# Patient Record
Sex: MALE | Race: WHITE | NOT HISPANIC OR LATINO | Employment: UNEMPLOYED | URBAN - NONMETROPOLITAN AREA
[De-identification: names, ages, dates, MRNs, and addresses within clinical notes are randomized per-mention and may not be internally consistent; named-entity substitution may affect disease eponyms.]

---

## 2021-04-16 ENCOUNTER — IMMUNIZATION (OUTPATIENT)
Dept: VACCINE CLINIC | Facility: HOSPITAL | Age: 31
End: 2021-04-16

## 2021-04-16 PROCEDURE — 0001A: CPT | Performed by: INTERNAL MEDICINE

## 2021-04-16 PROCEDURE — 91300 HC SARSCOV02 VAC 30MCG/0.3ML IM: CPT | Performed by: INTERNAL MEDICINE

## 2021-05-07 ENCOUNTER — IMMUNIZATION (OUTPATIENT)
Dept: VACCINE CLINIC | Facility: HOSPITAL | Age: 31
End: 2021-05-07

## 2021-05-07 PROCEDURE — 91300 HC SARSCOV02 VAC 30MCG/0.3ML IM: CPT | Performed by: INTERNAL MEDICINE

## 2021-05-07 PROCEDURE — 0002A: CPT | Performed by: INTERNAL MEDICINE

## 2023-01-20 ENCOUNTER — HOSPITAL ENCOUNTER (OUTPATIENT)
Age: 33
Discharge: HOME | End: 2023-01-20

## 2023-01-20 ENCOUNTER — OFFICE VISIT (OUTPATIENT)
Dept: INTERNAL MEDICINE CLINIC | Age: 33
End: 2023-01-20

## 2023-01-20 VITALS
DIASTOLIC BLOOD PRESSURE: 80 MMHG | HEART RATE: 108 BPM | OXYGEN SATURATION: 97 % | WEIGHT: 201.8 LBS | SYSTOLIC BLOOD PRESSURE: 128 MMHG | HEIGHT: 67 IN | TEMPERATURE: 97.3 F | BODY MASS INDEX: 31.67 KG/M2

## 2023-01-20 DIAGNOSIS — Z00.00 PREVENTATIVE HEALTH CARE: ICD-10-CM

## 2023-01-20 DIAGNOSIS — E66.9 CLASS 1 OBESITY WITHOUT SERIOUS COMORBIDITY WITH BODY MASS INDEX (BMI) OF 31.0 TO 31.9 IN ADULT, UNSPECIFIED OBESITY TYPE: ICD-10-CM

## 2023-01-20 DIAGNOSIS — Z76.89 ENCOUNTER TO ESTABLISH CARE: Primary | ICD-10-CM

## 2023-01-20 DIAGNOSIS — F41.1 GENERALIZED ANXIETY DISORDER: ICD-10-CM

## 2023-01-20 LAB
A/G RATIO: 2 (ref 1.1–2.2)
ALBUMIN SERPL-MCNC: 5 G/DL (ref 3.4–5)
ALP BLD-CCNC: 66 U/L (ref 40–129)
ALT SERPL-CCNC: 23 U/L (ref 10–40)
AMPHETAMINE SCREEN, URINE: ABNORMAL
ANION GAP SERPL CALCULATED.3IONS-SCNC: 12 MMOL/L (ref 3–16)
AST SERPL-CCNC: 19 U/L (ref 15–37)
BARBITURATE SCREEN URINE: ABNORMAL
BASOPHILS ABSOLUTE: 0.1 K/UL (ref 0–0.2)
BASOPHILS RELATIVE PERCENT: 0.6 %
BENZODIAZEPINE SCREEN, URINE: ABNORMAL
BILIRUB SERPL-MCNC: 0.8 MG/DL (ref 0–1)
BUN BLDV-MCNC: 12 MG/DL (ref 7–20)
CALCIUM SERPL-MCNC: 10.6 MG/DL (ref 8.3–10.6)
CANNABINOID SCREEN URINE: POSITIVE
CHLORIDE BLD-SCNC: 100 MMOL/L (ref 99–110)
CHOLESTEROL, TOTAL: 212 MG/DL (ref 0–199)
CO2: 25 MMOL/L (ref 21–32)
COCAINE METABOLITE SCREEN URINE: ABNORMAL
CREAT SERPL-MCNC: 1.2 MG/DL (ref 0.9–1.3)
EOSINOPHILS ABSOLUTE: 0.3 K/UL (ref 0–0.6)
EOSINOPHILS RELATIVE PERCENT: 1.9 %
FENTANYL SCREEN, URINE: ABNORMAL
GFR SERPL CREATININE-BSD FRML MDRD: >60 ML/MIN/{1.73_M2}
GLUCOSE BLD-MCNC: 92 MG/DL (ref 70–99)
HCT VFR BLD CALC: 49.9 % (ref 40.5–52.5)
HDLC SERPL-MCNC: 43 MG/DL (ref 40–60)
HEMOGLOBIN: 16.4 G/DL (ref 13.5–17.5)
HEPATITIS C ANTIBODY INTERPRETATION: NORMAL
LDL CHOLESTEROL CALCULATED: 136 MG/DL
LYMPHOCYTES ABSOLUTE: 3.1 K/UL (ref 1–5.1)
LYMPHOCYTES RELATIVE PERCENT: 22 %
Lab: ABNORMAL
MCH RBC QN AUTO: 31.2 PG (ref 26–34)
MCHC RBC AUTO-ENTMCNC: 32.9 G/DL (ref 31–36)
MCV RBC AUTO: 94.6 FL (ref 80–100)
METHADONE SCREEN, URINE: ABNORMAL
MONOCYTES ABSOLUTE: 1 K/UL (ref 0–1.3)
MONOCYTES RELATIVE PERCENT: 7.5 %
NEUTROPHILS ABSOLUTE: 9.5 K/UL (ref 1.7–7.7)
NEUTROPHILS RELATIVE PERCENT: 68 %
OPIATE SCREEN URINE: ABNORMAL
OXYCODONE URINE: ABNORMAL
PDW BLD-RTO: 12.7 % (ref 12.4–15.4)
PH UA: 5
PHENCYCLIDINE SCREEN URINE: ABNORMAL
PLATELET # BLD: 306 K/UL (ref 135–450)
PMV BLD AUTO: 9.8 FL (ref 5–10.5)
POTASSIUM SERPL-SCNC: 4.5 MMOL/L (ref 3.5–5.1)
RBC # BLD: 5.27 M/UL (ref 4.2–5.9)
SODIUM BLD-SCNC: 137 MMOL/L (ref 136–145)
TOTAL PROTEIN: 7.5 G/DL (ref 6.4–8.2)
TRIGL SERPL-MCNC: 164 MG/DL (ref 0–150)
TSH REFLEX FT4: 2.64 UIU/ML (ref 0.27–4.2)
VLDLC SERPL CALC-MCNC: 33 MG/DL
WBC # BLD: 13.9 K/UL (ref 4–11)

## 2023-01-20 PROCEDURE — 83036 HEMOGLOBIN GLYCOSYLATED A1C: CPT

## 2023-01-20 PROCEDURE — 86701 HIV-1ANTIBODY: CPT

## 2023-01-20 PROCEDURE — 80307 DRUG TEST PRSMV CHEM ANLYZR: CPT

## 2023-01-20 PROCEDURE — 87390 HIV-1 AG IA: CPT

## 2023-01-20 PROCEDURE — 86702 HIV-2 ANTIBODY: CPT

## 2023-01-20 PROCEDURE — 85025 COMPLETE CBC W/AUTO DIFF WBC: CPT

## 2023-01-20 PROCEDURE — 86803 HEPATITIS C AB TEST: CPT

## 2023-01-20 PROCEDURE — 80053 COMPREHEN METABOLIC PANEL: CPT

## 2023-01-20 PROCEDURE — 84443 ASSAY THYROID STIM HORMONE: CPT

## 2023-01-20 PROCEDURE — 80061 LIPID PANEL: CPT

## 2023-01-20 PROCEDURE — 36415 COLL VENOUS BLD VENIPUNCTURE: CPT

## 2023-01-20 RX ORDER — HYDROXYZINE 50 MG/1
50 TABLET, FILM COATED ORAL 3 TIMES DAILY PRN
COMMUNITY

## 2023-01-20 RX ORDER — SERTRALINE HYDROCHLORIDE 25 MG/1
25 TABLET, FILM COATED ORAL DAILY
Qty: 30 TABLET | Refills: 3 | Status: SHIPPED | OUTPATIENT
Start: 2023-01-20

## 2023-01-20 RX ORDER — ALPRAZOLAM 0.5 MG/1
0.25 TABLET ORAL 3 TIMES DAILY PRN
Qty: 45 TABLET | Refills: 0 | Status: SHIPPED | OUTPATIENT
Start: 2023-01-20 | End: 2023-02-19

## 2023-01-20 SDOH — ECONOMIC STABILITY: FOOD INSECURITY: WITHIN THE PAST 12 MONTHS, YOU WORRIED THAT YOUR FOOD WOULD RUN OUT BEFORE YOU GOT MONEY TO BUY MORE.: NEVER TRUE

## 2023-01-20 SDOH — ECONOMIC STABILITY: FOOD INSECURITY: WITHIN THE PAST 12 MONTHS, THE FOOD YOU BOUGHT JUST DIDN'T LAST AND YOU DIDN'T HAVE MONEY TO GET MORE.: NEVER TRUE

## 2023-01-20 ASSESSMENT — PATIENT HEALTH QUESTIONNAIRE - PHQ9
2. FEELING DOWN, DEPRESSED OR HOPELESS: 0
SUM OF ALL RESPONSES TO PHQ QUESTIONS 1-9: 0
SUM OF ALL RESPONSES TO PHQ9 QUESTIONS 1 & 2: 0
1. LITTLE INTEREST OR PLEASURE IN DOING THINGS: 0
SUM OF ALL RESPONSES TO PHQ QUESTIONS 1-9: 0

## 2023-01-20 ASSESSMENT — SOCIAL DETERMINANTS OF HEALTH (SDOH): HOW HARD IS IT FOR YOU TO PAY FOR THE VERY BASICS LIKE FOOD, HOUSING, MEDICAL CARE, AND HEATING?: NOT HARD AT ALL

## 2023-01-20 NOTE — PROGRESS NOTES
[facility]  Establish care visit   2023    Heather Aguilar (:  1990) is a 34 y.o. male, here to establish care. Chief Complaint   Patient presents with    Established New Doctor    Anxiety        ASSESSMENT/ PLAN  1. Encounter to establish care    2. Preventative health care  - TSH with Reflex to FT4; Future  - HIV Screen; Future  - Hepatitis C Antibody; Future  - Lipid Panel; Future  - Comprehensive Metabolic Panel; Future  - CBC with Auto Differential; Future  - Hemoglobin A1C; Future    3. Generalized anxiety disorder  -Symptoms consistent with anxiety disorder. Start on Zoloft 25 mg daily, start Xanax for 30 days until Zoloft kicks in. Denies any SI or HI. Denies feeling depressed. Follow-up with Cheikh Garcia in 4 to 6 weeks. Medication contract signed. Obtain urine drug screen  - ALPRAZolam (XANAX) 0.5 MG tablet; Take 0.5 tablets by mouth 3 times daily as needed for Sleep for up to 30 days. Max Daily Amount: 0.75 mg  Dispense: 45 tablet; Refill: 0  - sertraline (ZOLOFT) 25 MG tablet; Take 1 tablet by mouth daily  Dispense: 30 tablet; Refill: 3  - DRUG SCREEN MULTI URINE; Future    4. Class 1 obesity without serious comorbidity with body mass index (BMI) of 31.0 to 31.9 in adult, unspecified obesity type  -BMI of 31.61. Counseled on diet weight loss and exercise. Return in about 6 months (around 2023) for Anxiety . HPI  Patient is a 28-year-old male who presents to establish care with me. He does not have any significant past medical history. He noted that a couple of days ago he got into a car and felt scared to drive, felt lightheaded. He has a family history of diabetes and thought that his blood glucose was low. He went back home and measured his blood pressure which was high, he kept measuring blood pressures which kept getting higher and higher. He did not understand what was going on with him.   He went to the urgent care where he was discharged with Atarax with some relief. Denies any chest pain, shortness of breath today. 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 Does not feel depressed and does not have any SI or HI. Denies any chest pain, shortness of breath today. His blood pressure measured twice in the clinic was at goal today. ROS:      CONSTITUTIONAL:  No fevers, chills, sweats or weight changes  EYES:  No redness or visual symptoms. EARS, NOSE AND THROAT:  No difficulties with hearing. No symptoms of rhinitis or sore throat. CARDIOVASCULAR:  No chest pains, palpitations, orthopnea or paroxysmal noctunal dyspnea. RESPIRATORY:  No dyspnea on exertion, wheezing or cough. GI:  No nausea, vomiting, diarrhea, constipation, abdominal pain, hematochezia or melena. :  No dysuria, urinary hesitancy or dribbling. No nocturia or urinary frequency. No abnormal urethral  discharge. MUSCULOSKELETAL:  No muscle, joint or back aches  NEUROLOGIC:  No chronic headaches, no seizures. No numbness, tingling or weakness. PSYCHIATRIC: Endorses problems with anxiety  ENDOCRINE:  No excessive urination or excessive thirst.  DERMATOLOGIC:  No rashes or skin changes. HISTORIES  Current Outpatient Medications on File Prior to Visit   Medication Sig Dispense Refill    hydrOXYzine HCl (ATARAX) 50 MG tablet Take 50 mg by mouth 3 times daily as needed for Itching       No current facility-administered medications on file prior to visit. No Known Allergies    Past Medical History:   Diagnosis Date    Anxiety        There is no problem list on file for this patient.        Past Surgical History:   Procedure Laterality Date    TONSILLECTOMY  07/07/1995       Social History     Socioeconomic History    Marital status: Unknown     Spouse name: Not on file    Number of children: Not on file    Years of education: Not on file    Highest education level: Not on file   Occupational History    Not on file   Tobacco Use    Smoking status: Never Smokeless tobacco: Never   Vaping Use    Vaping Use: Never used   Substance and Sexual Activity    Alcohol use: Never    Drug use: Not Currently     Types: Marijuana Dauna Other)    Sexual activity: Not Currently     Partners: Female   Other Topics Concern    Not on file   Social History Narrative    Not on file     Social Determinants of Health     Financial Resource Strain: Low Risk     Difficulty of Paying Living Expenses: Not hard at all   Food Insecurity: No Food Insecurity    Worried About Running Out of Food in the Last Year: Never true    Ran Out of Food in the Last Year: Never true   Transportation Needs: Not on file   Physical Activity: Not on file   Stress: Not on file   Social Connections: Not on file   Intimate Partner Violence: Not on file   Housing Stability: Not on file        Family History   Problem Relation Age of Onset    Asthma Father     Anxiety Disorder Father     Rashes/Skin Problems Paternal Grandfather        Physical Exam:    GENERAL APPEARANCE:  The patient is pleasant and well-appearing, in no acute distress A&Ox3, normal habitus. No lymphadenopathy. LUNGS:  Equal air entry and clear of auscultation bilaterally. There are no crackles, wheezes or rhonchi noted. HEART:  Regular rate and rhythm, S1/S2. No murmurs are noted. There are no lifts, heaves, or thrills noted on palpation. ABDOMEN:  Soft, non tender, non distended and no organomegaly. There are good bowel sounds. There is no rebounding or guarding. There is no evidence of hernia. SKIN:  There are no rashes, lesions, or ulcers noted. Warm and dry with good turgor. MUSCULOSKELETAL:  Gait is coordinated and smooth. There is no clubbing, cyanosis or edema. B/I pedal pulses are palpable. NEUROLOGIC:  Cranial nerves II through XII are grossly intact. Sensation to light touch and pain is intact and bilaterally.     Vitals:    01/20/23 1050   BP: 128/80   Site: Right Upper Arm   Position: Sitting   Cuff Size: Medium Adult Pulse: (!) 108   Temp: 97.3 °F (36.3 °C)   TempSrc: Temporal   SpO2: 97%   Weight: 201 lb 12.8 oz (91.5 kg)   Height: 5' 7\" (1.702 m)     Estimated body mass index is 31.61 kg/m² as calculated from the following:    Height as of this encounter: 5' 7\" (1.702 m). Weight as of this encounter: 201 lb 12.8 oz (91.5 kg). Immunization History   Administered Date(s) Administered    COVID-19, PFIZER PURPLE top, DILUTE for use, (age 15 y+), 30mcg/0.3mL 04/16/2021, 05/07/2021       Health Maintenance   Topic Date Due    Varicella vaccine (1 of 2 - 2-dose childhood series) Never done    HIV screen  Never done    Hepatitis C screen  Never done    DTaP/Tdap/Td vaccine (1 - Tdap) Never done    COVID-19 Vaccine (3 - Booster for Pfizer series) 07/02/2021    Flu vaccine (1) Never done    Depression Screen  01/21/2023    Hepatitis A vaccine  Aged Out    Hib vaccine  Aged Out    Meningococcal (ACWY) vaccine  Aged Out    Pneumococcal 0-64 years Vaccine  Aged Out       PSH, PMH, SH and FH reviewed and noted. Recent and past labs, tests and consults also reviewed. Recent or new meds also reviewed. Paula Greenwood MD    This dictation was generated by voice recognition computer software. Although all attempts are made to edit the dictation for accuracy, there may be errors in the transcription that are not intended.

## 2023-01-21 LAB
ESTIMATED AVERAGE GLUCOSE: 93.9 MG/DL
HBA1C MFR BLD: 4.9 %
HIV AG/AB: NORMAL
HIV ANTIGEN: NORMAL
HIV-1 ANTIBODY: NORMAL
HIV-2 AB: NORMAL

## 2023-01-27 ENCOUNTER — TELEPHONE (OUTPATIENT)
Dept: CARDIOLOGY CLINIC | Age: 33
End: 2023-01-27

## 2023-01-27 NOTE — TELEPHONE ENCOUNTER
SCREENING QUESTIONS:       Do you have a history of cardiovascular disease, this includes any of the following- heart stent and/or open-heart surgery, stroke or abdominal aortic aneurysm? No (If the answer is yes please do not schedule)     Are you currently following up with a cardiologist? No     If no, would you like our office to contact you? No        Do you have a family history of abdominal aortic aneurysm, heart attack or stroke? Yes Grandfather had a heart attack     Do you have high cholesterol? Yes    Do you have high blood pressure? Yes    Do you have diabetes? No    Do you currently smoke or are you a former smoker?  Yes Former           WRAP UP:    [] Scheduled on 02/16/23 at the [facility]    [x] Instructions given to patient- Nothing to eat or drink 8 hrs prior to appointment and wear loose, comfortable clothing    [] Not scheduled due to previous history themselves    [] Sent to  pool, has a cardiac history and is not following a cardiologist, would like a follow call

## 2023-01-30 ENCOUNTER — TELEPHONE (OUTPATIENT)
Dept: INTERNAL MEDICINE CLINIC | Age: 33
End: 2023-01-30

## 2023-01-30 NOTE — TELEPHONE ENCOUNTER
Patient updating Dr. Erika Perez that his BP is still consistently high since taking the prescribed medication at last visit. He thinks the anxiety is ok but he's not sure if the anxiety meds are contributing to the high BP. Or if he may need to start a BP medication?

## 2023-01-31 ENCOUNTER — OFFICE VISIT (OUTPATIENT)
Dept: INTERNAL MEDICINE CLINIC | Age: 33
End: 2023-01-31

## 2023-01-31 VITALS
SYSTOLIC BLOOD PRESSURE: 128 MMHG | HEART RATE: 64 BPM | RESPIRATION RATE: 14 BRPM | BODY MASS INDEX: 31.17 KG/M2 | DIASTOLIC BLOOD PRESSURE: 78 MMHG | WEIGHT: 199 LBS | TEMPERATURE: 98.4 F | OXYGEN SATURATION: 99 %

## 2023-01-31 DIAGNOSIS — F41.1 GENERALIZED ANXIETY DISORDER: Primary | ICD-10-CM

## 2023-01-31 DIAGNOSIS — E78.2 MIXED HYPERLIPIDEMIA: ICD-10-CM

## 2023-01-31 DIAGNOSIS — Z01.30 BLOOD PRESSURE CHECK: ICD-10-CM

## 2023-01-31 PROCEDURE — 99213 OFFICE O/P EST LOW 20 MIN: CPT | Performed by: STUDENT IN AN ORGANIZED HEALTH CARE EDUCATION/TRAINING PROGRAM

## 2023-01-31 NOTE — PROGRESS NOTES
[facility]   2023    Angy Ritchie (:  1990) is a 34 y.o. male, here for evaluation of the following medical concerns:    Chief Complaint   Patient presents with    Hypertension     BP Check         ASSESSMENT/ PLAN  1. Generalized anxiety disorder  -Continue follow-up with psychiatry. Continue on Zoloft 50 mg daily and hydroxyzine. Notes marked improvement of symptoms    2. Blood pressure check  -Blood pressure today at goal.  Did check patient's wrist blood pressure cuff. Appears that his cuff is giving a higher reading. Advised patient to get a arm cuff if he can. Counseled on DASH diet, exercise, weight loss. 3.  Mixed hyperlipidemia  -Recent LDL of 136. Counseled on diet weight loss and exercise as above. HPI  Patient is a 25-year-old male presenting for follow-up for anxiety disorder and check blood pressure. He noted that he has been measuring blood pressures at home which have been about the 988 systolic. He however denies any chest pain, shortness of breath, lower extremity edema. I have asked him to get his blood pressure monitor to the visit which he has brought with him. He has also recently seen a psychiatrist and had increasing Zoloft to 50 mg daily and is currently on hydroxyzine. Not taking Xanax anymore. Notes marked improvement in symptoms    ROS    CONSTITUTIONAL:  No fevers, chills, sweats or weight changes  EYES:  No redness or visual symptoms. EARS, NOSE AND THROAT:  No difficulties with hearing. No symptoms of rhinitis or sore throat. CARDIOVASCULAR:  No chest pains, palpitations, orthopnea or paroxysmal noctunal dyspnea. RESPIRATORY:  No dyspnea o exertion, wheezing or cough. GI:  No nausea, vomiting, diarrhea, constipation, abdominal pain, hematochezia or melena. :  No dysuria, urinary hesitancy or dribbling. No nocturia or urinary frequency. No abnormal urethral  discharge.   MUSCULOSKELETAL:  No muscle, joint or back aches  NEUROLOGIC:  No chronic headaches, no seizures. No numbness, tingling or weakness. PSYCHIATRIC:  No anxiety, mood or sleep disturbance. ENDOCRINE:  No excessive urination or excessive thirst.  DERMATOLOGIC:  No rashes or skin changes. HISTORIES  Current Outpatient Medications on File Prior to Visit   Medication Sig Dispense Refill    sertraline (ZOLOFT) 50 MG tablet       hydrOXYzine HCl (ATARAX) 50 MG tablet Take 50 mg by mouth 3 times daily as needed for Itching       No current facility-administered medications on file prior to visit. Past Medical History:   Diagnosis Date    Anxiety      There is no problem list on file for this patient. PHYSICAL EXAM    Vitals:    01/31/23 1155   BP: 128/78   Site: Right Upper Arm   Position: Sitting   Cuff Size: Medium Adult   Pulse: 64   Resp: 14   Temp: 98.4 °F (36.9 °C)   TempSrc: Temporal   SpO2: 99%   Weight: 199 lb (90.3 kg)     Estimated body mass index is 31.17 kg/m² as calculated from the following:    Height as of 1/20/23: 5' 7\" (1.702 m). Weight as of this encounter: 199 lb (90.3 kg). GENERAL APPEARANCE:  The patient is pleasant and well-appearing, in no acure distress A&Ox3, normal habitus  HEENT:  Normocephalic and atraumatic. No lymphadenopathy. LUNGS:  Equal air entry and clear of auscultation bilaterally. There are not crackles, wheezes or rhonchi noted. HEART:  Regular rate and rhythm, S1/S2. No murmurs are noted. There are no lifts, heaves, or thrills noted on palpation. SKIN:  There are no rashes, lesions, or ulcers noted. Warm and dry with good turgor. MUSCULOSKELETAL:  Russella Claw is coordinated and smooth. There is no clubbing, cyanosis or edema. B/I pedal pulses are palpable. NEUROLOGIC:  Cranial nerves II through XII are grossly intact. Sensation to light touch and pain is intact and bilaterally. Mandy Craft MD    This dictation was generated by voice recognition computer software.   Although all attempts are made to edit the dictation for accuracy, there may be errors in the transcription that are not intended.

## 2023-02-16 ENCOUNTER — PROCEDURE VISIT (OUTPATIENT)
Dept: CARDIOLOGY CLINIC | Age: 33
End: 2023-02-16

## 2023-02-16 DIAGNOSIS — Z13.6 ENCOUNTER FOR SCREENING FOR CARDIOVASCULAR DISORDERS: Primary | ICD-10-CM

## 2024-11-13 ENCOUNTER — TELEPHONE (OUTPATIENT)
Dept: INTERNAL MEDICINE CLINIC | Age: 34
End: 2024-11-13

## 2024-11-13 NOTE — TELEPHONE ENCOUNTER
Patient called and wanted to be referred to a psychiatrist. I told him he would need to see Siomara first.  Need a doctor to refer him to see Siomara.      Patient also states he is completely out of his sertraline and would like a refill to be sent to [facility] in [place], [place], pharmacy phone #[phone]  Patient states it is dangerous for him to be off the sertraline.

## 2024-11-13 NOTE — TELEPHONE ENCOUNTER
Please call pharmacy to see when last filled and by whom.     I would argue that he should have an appointment scheduled to be seen before a refill can be sent

## 2024-11-13 NOTE — TELEPHONE ENCOUNTER
Patient has not been seen since 01/31/23 in our office, does not look like Dr. Coy has ever filled this medication

## 2024-11-14 NOTE — TELEPHONE ENCOUNTER
Additional Comments     Patient is coming in on Tuesday, 11- at 11:40 a.m. to see Dr. Coy for a Medication Check and Refills.     Only pended this medication with 0 Refills so he can come in for his appointment and then his PCP can give him his refills.           Refill request for ZOLOFT 50 MG medication.     Name of Pharmacy- [facility]      Last visit - 1-     Pending visit - 11-    Last refill - 1-      Medication Contract signed -   Last Oarrs ran-

## 2024-11-14 NOTE — TELEPHONE ENCOUNTER
Called [facility] and they stated that it was last filled on 3-7-2023 by Dr. Andrez Kan in [place], [place]

## 2024-11-14 NOTE — TELEPHONE ENCOUNTER
Patient called back and he is on the schedule to come in on Wednesday at 11:40 a.m. to see Dr. Coy for a medication refill.     Wants to know if the medicine can be filled today because he has been out for 2 or 3 days and he can already feel the withdrawals and he hasn't been able to sleep the last few nights.

## 2024-11-19 ENCOUNTER — OFFICE VISIT (OUTPATIENT)
Dept: INTERNAL MEDICINE CLINIC | Age: 34
End: 2024-11-19
Payer: COMMERCIAL

## 2024-11-19 VITALS
TEMPERATURE: 97.9 F | BODY MASS INDEX: 35.63 KG/M2 | SYSTOLIC BLOOD PRESSURE: 116 MMHG | HEIGHT: 67 IN | HEART RATE: 66 BPM | WEIGHT: 227 LBS | OXYGEN SATURATION: 98 % | DIASTOLIC BLOOD PRESSURE: 82 MMHG

## 2024-11-19 DIAGNOSIS — F41.1 GENERALIZED ANXIETY DISORDER: Primary | ICD-10-CM

## 2024-11-19 DIAGNOSIS — E78.2 MIXED HYPERLIPIDEMIA: ICD-10-CM

## 2024-11-19 DIAGNOSIS — E66.812 CLASS 2 SEVERE OBESITY WITH SERIOUS COMORBIDITY AND BODY MASS INDEX (BMI) OF 35.0 TO 35.9 IN ADULT, UNSPECIFIED OBESITY TYPE: ICD-10-CM

## 2024-11-19 DIAGNOSIS — E66.01 CLASS 2 SEVERE OBESITY WITH SERIOUS COMORBIDITY AND BODY MASS INDEX (BMI) OF 35.0 TO 35.9 IN ADULT, UNSPECIFIED OBESITY TYPE: ICD-10-CM

## 2024-11-19 PROCEDURE — 99214 OFFICE O/P EST MOD 30 MIN: CPT | Performed by: STUDENT IN AN ORGANIZED HEALTH CARE EDUCATION/TRAINING PROGRAM

## 2024-11-19 RX ORDER — SERTRALINE HYDROCHLORIDE 100 MG/1
100 TABLET, FILM COATED ORAL DAILY
Qty: 90 TABLET | Refills: 1 | Status: SHIPPED | OUTPATIENT
Start: 2024-11-19

## 2024-11-19 SDOH — ECONOMIC STABILITY: FOOD INSECURITY: WITHIN THE PAST 12 MONTHS, THE FOOD YOU BOUGHT JUST DIDN'T LAST AND YOU DIDN'T HAVE MONEY TO GET MORE.: SOMETIMES TRUE

## 2024-11-19 SDOH — ECONOMIC STABILITY: INCOME INSECURITY: HOW HARD IS IT FOR YOU TO PAY FOR THE VERY BASICS LIKE FOOD, HOUSING, MEDICAL CARE, AND HEATING?: VERY HARD

## 2024-11-19 SDOH — ECONOMIC STABILITY: FOOD INSECURITY: WITHIN THE PAST 12 MONTHS, YOU WORRIED THAT YOUR FOOD WOULD RUN OUT BEFORE YOU GOT MONEY TO BUY MORE.: SOMETIMES TRUE

## 2024-11-19 SDOH — ECONOMIC STABILITY: TRANSPORTATION INSECURITY
IN THE PAST 12 MONTHS, HAS LACK OF TRANSPORTATION KEPT YOU FROM MEETINGS, WORK, OR FROM GETTING THINGS NEEDED FOR DAILY LIVING?: YES

## 2024-11-19 ASSESSMENT — PATIENT HEALTH QUESTIONNAIRE - PHQ9
SUM OF ALL RESPONSES TO PHQ QUESTIONS 1-9: 0
SUM OF ALL RESPONSES TO PHQ QUESTIONS 1-9: 0
SUM OF ALL RESPONSES TO PHQ9 QUESTIONS 1 & 2: 0
2. FEELING DOWN, DEPRESSED OR HOPELESS: NOT AT ALL
1. LITTLE INTEREST OR PLEASURE IN DOING THINGS: NOT AT ALL
SUM OF ALL RESPONSES TO PHQ QUESTIONS 1-9: 0
SUM OF ALL RESPONSES TO PHQ QUESTIONS 1-9: 0
1. LITTLE INTEREST OR PLEASURE IN DOING THINGS: NOT AT ALL
2. FEELING DOWN, DEPRESSED OR HOPELESS: NOT AT ALL
SUM OF ALL RESPONSES TO PHQ9 QUESTIONS 1 & 2: 0

## 2024-11-19 NOTE — PROGRESS NOTES
[facility]   2024    Ben Willson (:  1990) is a 34 y.o. male, here for evaluation of the following medical concerns:    Chief Complaint   Patient presents with    Medication Refill     Medication refill and med check         ASSESSMENT/ PLAN  1. Generalized anxiety disorder  Mood is improved.  Continue on sertraline.  Wants to establish care with behavioral health.  Recommended to establish care with Siomara.  Refills for sertraline provided  - sertraline (ZOLOFT) 100 MG tablet; Take 1 tablet by mouth daily  Dispense: 90 tablet; Refill: 1    2. Mixed hyperlipidemia  3.  Class II severe obesity with serious comorbidity and body mass index of 35-35.9 in adult, unspecified obesity type  Advised dietary modification, exercise, weight loss  - Comprehensive Metabolic Panel; Future  - LIPID PANEL; Future  - CBC with Auto Differential; Future       Return in about 1 year (around 2025) for Physical .      HPI  Patient is a 34-year-old male presenting for follow-up for anxiety.  He was placed on sertraline and is doing well.  He is currently up to 100 mg daily with good response.  He wants to set up care with behavioral health.  He denies any chest pain, shortness of breath today.  Is due for labs.  Orders placed.  Has gained some weight.  Discussed dietary modification.    ROS    CONSTITUTIONAL:  No fevers, chills, sweats or weight changes  EYES:  No redness or visual symptoms.  EARS, NOSE AND THROAT:  No difficulties with hearing.  No symptoms of rhinitis or sore throat.  CARDIOVASCULAR:  No chest pains, palpitations, orthopnea or paroxysmal noctunal dyspnea.  RESPIRATORY:  No dyspnea o exertion, wheezing or cough.  GI:  No nausea, vomiting, diarrhea, constipation, abdominal pain, hematochezia or melena.    :  No dysuria, urinary hesitancy or dribbling.  No nocturia or urinary frequency.  No abnormal urethral  discharge.  MUSCULOSKELETAL:  No muscle, joint or back aches  NEUROLOGIC:  No chronic

## 2025-01-17 ENCOUNTER — OFFICE VISIT (OUTPATIENT)
Dept: PSYCHOLOGY | Age: 35
End: 2025-01-17
Payer: COMMERCIAL

## 2025-01-17 DIAGNOSIS — R41.840 ATTENTION AND CONCENTRATION DEFICIT: ICD-10-CM

## 2025-01-17 DIAGNOSIS — F41.1 GAD (GENERALIZED ANXIETY DISORDER): Primary | ICD-10-CM

## 2025-01-17 PROCEDURE — 90791 PSYCH DIAGNOSTIC EVALUATION: CPT | Performed by: SOCIAL WORKER

## 2025-01-17 NOTE — PROGRESS NOTES
Behavioral Health Consultation  Siomara SAWYERMerlin Moses CLAUDETTE  1/17/2025  7:35 AM      Time spent with Patient: 0 minutes  This is patient's first  [facility] appointment.    Reason for Consult:  anxiety  Referring Provider: Rufino Coy MD  [address] [facility]  [address]  [place] [address]    Pt provided informed consent for the behavioral health program. Discussed with patient model of service to include the limits of confidentiality (i.e. abuse reporting, suicide intervention, etc.) and short-term intervention focused approach.  Pt indicated understanding.  Feedback given to PCP.    S:  Patient presents with concerns about anxiety and undx ADHD. Saw someone in the past and screened for adhd and felt may be possible.  Saw psychiatry and dx and started a few medication but did not have success. Felt like it helped the adhd but dulled senses.  Currently on sertraline.  Patient finds relief and feels good efficacy.  Was working for [employer] and had and started feeling panicky.  Unsure of precipitant. Sleeping a lot more over the past 6-7 months.  Can sleep 12-16 hrs of sleep.    Fear of needles.     No set schedule for sleep, will fall asleep between the hrs of 3am and then will wake    Family hx of depression and anxiety. Mother struggles with SI. Pt denies SI, always believes that tmrw will be better.  Some fear around what happens after death,.  Used to be more social than used to.  I am more choosey with my time, Likes a healthy debate, however feels arguing brings out the worst in him.     Nicotine daily use.  Mimimal etoh use. Daily marijuana use.        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  Lives with his sister.  Open to psychiatry and sleep study.      O:  MSE:    Appearance: good hygiene   Attitude: cooperative and friendly  Consciousness: alert  Orientation: oriented to person, place, time, general circumstance  Memory: recent and remote memory

## 2025-01-17 NOTE — PATIENT INSTRUCTIONS
Lara will call to schedule with Dr. Paul  Between the hrs of 11pm and 4am   3.  Resveratrol anti-aging   4.  [facility]-on ADHD  5.  Youtube: Adeel Talk -the power of vulnerability and listening to shame  6.  Read Wim Hof Book  7.  Dr. Coy for sleep study  8.  Aga Wilson- [email or web address]=direct_link&[email or web address]_campaign=79120  9.  [facility] episode with Ami Oh  10.         Deep Breathing Exercises      Exercise 1:  The Stimulating Breath (also called the Lennie Breath)   Its aim is to raise energy level and increase alertness.  Inhale and exhale rapidly through your nose, keeping your mouth closed but relaxed. Your breaths in and out should be equal in duration, but as short as possible. This is a noisy breathing exercise.  Try for three in-and-out breath cycles per second. This produces a quick movement of the diaphragm, suggesting a lennie. Breathe normally after each cycle.  Do not do for more than 15 seconds on your first try. Each time you practice the Stimulating Breath, you can increase your time by five seconds or so, until you reach a full minute.  If done properly, you may feel invigorated, comparable to the heightened awareness you feel after a good workout. You should feel the effort at the back of the neck, the diaphragm, the chest and the abdomen. Try this breathing exercise the next time you need an energy boost and feel yourself reaching for a cup of coffee.    Exercise 2:  The 4-7-8 (or Relaxing Breath) Exercise  This exercise is utterly simple, takes almost no time, requires no equipment and can be done anywhere. Although you can do the exercise in any position, sit with your back straight while learning the exercise. Place the tip of your tongue against the ridge of tissue just behind your upper front teeth, and keep it there through the entire exercise. You will be exhaling through your mouth around your tongue; try

## 2025-01-21 DIAGNOSIS — R06.83 SNORING: Primary | ICD-10-CM

## 2025-01-29 ENCOUNTER — TELEMEDICINE (OUTPATIENT)
Dept: PSYCHIATRY | Age: 35
End: 2025-01-29
Payer: COMMERCIAL

## 2025-01-29 DIAGNOSIS — F41.1 GAD (GENERALIZED ANXIETY DISORDER): Primary | ICD-10-CM

## 2025-01-29 PROCEDURE — 99205 OFFICE O/P NEW HI 60 MIN: CPT | Performed by: STUDENT IN AN ORGANIZED HEALTH CARE EDUCATION/TRAINING PROGRAM

## 2025-01-29 ASSESSMENT — ANXIETY QUESTIONNAIRES
4. TROUBLE RELAXING: MORE THAN HALF THE DAYS
2. NOT BEING ABLE TO STOP OR CONTROL WORRYING: SEVERAL DAYS
GAD7 TOTAL SCORE: 11
7. FEELING AFRAID AS IF SOMETHING AWFUL MIGHT HAPPEN: SEVERAL DAYS
5. BEING SO RESTLESS THAT IT IS HARD TO SIT STILL: NEARLY EVERY DAY
1. FEELING NERVOUS, ANXIOUS, OR ON EDGE: SEVERAL DAYS
6. BECOMING EASILY ANNOYED OR IRRITABLE: MORE THAN HALF THE DAYS
3. WORRYING TOO MUCH ABOUT DIFFERENT THINGS: SEVERAL DAYS

## 2025-01-29 ASSESSMENT — PATIENT HEALTH QUESTIONNAIRE - PHQ9
7. TROUBLE CONCENTRATING ON THINGS, SUCH AS READING THE NEWSPAPER OR WATCHING TELEVISION: NEARLY EVERY DAY
3. TROUBLE FALLING OR STAYING ASLEEP: NEARLY EVERY DAY
2. FEELING DOWN, DEPRESSED OR HOPELESS: MORE THAN HALF THE DAYS
9. THOUGHTS THAT YOU WOULD BE BETTER OFF DEAD, OR OF HURTING YOURSELF: NOT AT ALL
8. MOVING OR SPEAKING SO SLOWLY THAT OTHER PEOPLE COULD HAVE NOTICED. OR THE OPPOSITE, BEING SO FIGETY OR RESTLESS THAT YOU HAVE BEEN MOVING AROUND A LOT MORE THAN USUAL: NOT AT ALL
6. FEELING BAD ABOUT YOURSELF - OR THAT YOU ARE A FAILURE OR HAVE LET YOURSELF OR YOUR FAMILY DOWN: SEVERAL DAYS
4. FEELING TIRED OR HAVING LITTLE ENERGY: SEVERAL DAYS
SUM OF ALL RESPONSES TO PHQ QUESTIONS 1-9: 10
1. LITTLE INTEREST OR PLEASURE IN DOING THINGS: NOT AT ALL
SUM OF ALL RESPONSES TO PHQ QUESTIONS 1-9: 10
5. POOR APPETITE OR OVEREATING: NOT AT ALL
SUM OF ALL RESPONSES TO PHQ9 QUESTIONS 1 & 2: 2

## 2025-01-29 NOTE — PROGRESS NOTES
Outpatient Psychiatry Integrated Care   [facility]     Patient name: Ben Willson  YOB: 1990  MRN: [id number]  Day of Service: 1/29/2025      Referring Primary Care Clinician: Rufino Coy MD    Video Visit: Ben Willson, was evaluated through a synchronous (real-time) audio-video encounter. The patient (or guardian if applicable) is aware that this is a billable service, which includes applicable co-pays. This Virtual Visit was conducted with patient's (and/or legal guardian's) consent. Patient identification was verified, and a caregiver was present when appropriate.   The patient was located at Home: [address] [place] [id number]  Provider was located at Home (Appt Dept State): [place]  Confirm you are appropriately licensed, registered, or certified to deliver care in the state where the patient is located as indicated above. If you are not or unsure, please re-schedule the visit: Yes, I confirm.       Reason for Visit:     Chief Complaint   Patient presents with    New Patient       CC: medication consult    HPI:     Ben Willson is a 34 y.o. White (non-) male with a history of anxiety who presents to outpatient primary care psychiatry on 1/29/2025 for psychiatric medication management.      Today, the patient reports he is trying to get back to where he is fully functional and comfortable again. The turning point when he stopped feeling fully functional was a couple of years ago, when he worked for [employer] and had his first panic attack while driving a car. Felt hot, like he was gong to pass out, and couldn't stop trembling. Didn't know what was going on with his body. Didn't think he was particularly stressed at the time, but also recognizes he was always on the go and drinking too much caffeine to keep up the pace. Has not had any panic attacks since being on Zoloft. He has not lived with any enduring fear or avoidance of having another panic attack. But

## 2025-01-29 NOTE — PROGRESS NOTES
New Patient is establishing virtually today, and would like to consult and or discuss medication and depression.     Referred By: Siomara   Occupation/Income: drives for [employer]   Education/School: some college   Children: no  Caffeine: cup of coffee a day   Illicit:no  Alcohol: On fridays a glass of wine with granddad   Relationship: single  Housing:lives with sister in a house   Social support: sister and friend   Hobbies: d&d golf video games     Previous diagnosis given: none  Psychiatric medications tried in past : none  Any previous psychiatric admissions:no  Any attempts: no  History of self harm: no  History of aggression/violence: no  Access to firearms guns or weapons: yes (placed in cases)  Legal History or Arrest: no    Abuse Trauma History: Temple and mental   Plans or Goals: would like to consult   Smoke: yes  Medicinal Marijuana or Controlled Substances: yes     Family Psychiatric History: mom (depression) dad (anxiety)      PHQ:10  ELBA:11

## 2025-02-10 ENCOUNTER — TELEMEDICINE (OUTPATIENT)
Dept: PSYCHOLOGY | Age: 35
End: 2025-02-10
Payer: COMMERCIAL

## 2025-02-10 DIAGNOSIS — R41.840 ATTENTION AND CONCENTRATION DEFICIT: ICD-10-CM

## 2025-02-10 DIAGNOSIS — F41.1 GAD (GENERALIZED ANXIETY DISORDER): Primary | ICD-10-CM

## 2025-02-10 PROCEDURE — 90832 PSYTX W PT 30 MINUTES: CPT | Performed by: SOCIAL WORKER

## 2025-02-10 NOTE — PROGRESS NOTES
can relate symptoms as reached out to him in an effort to be supportive to normalize his experiences. Pt saw psychiatry. No dx of adhd, more seems to be connected to anxiety. Pt has not followed up with sleep study, also needs to call Aga Wilson for specialty mental health.         O:  MSE:    Appearance: adequate hygiene  Attitude: cooperative and friendly  Consciousness: alert  Orientation: oriented to person, place, time, general circumstance  Memory: recent and remote memory intact  Attention/Concentration: intact during session  Psychomotor Activity:normal  Eye Contact: normal  Speech: normal rate and volume, well-articulated  Mood: anxious  Affect: anxious  Perception: within normal limits  Thought Content: within normal limits  Thought Process: logical, coherent and goal-directed  Insight: good  Judgment: intact  Ability to understand instructions: Yes  Ability to respond meaningfully: Yes  Morbid Ideation: no   Suicide Assessment: no suicidal ideation, plan, or intent  Homicidal Ideation: no    History:    Medications:   Current Outpatient Medications   Medication Sig Dispense Refill    sertraline (ZOLOFT) 100 MG tablet Take 1 tablet by mouth daily 90 tablet 1     No current facility-administered medications for this visit.     Social History:   Social History     Socioeconomic History    Marital status: Single     Spouse name: Not on file    Number of children: Not on file    Years of education: Not on file    Highest education level: Not on file   Occupational History    Not on file   Tobacco Use    Smoking status: Never    Smokeless tobacco: Never   Vaping Use    Vaping status: Never Used   Substance and Sexual Activity    Alcohol use: Never    Drug use: Not Currently     Types: Marijuana (Weed)    Sexual activity: Not Currently     Partners: Female   Other Topics Concern    Not on file   Social History Narrative    Not on file     Social Determinants of Health     Financial Resource Strain: High

## 2025-02-10 NOTE — PATIENT INSTRUCTIONS
1. [facility] episode with Ami Oh  2.   Follow up with sleep study  3.   Aga Wilson- [email or web address]_[email or web address]_medium=[email or web address]_[email or web address]  4.  Return to see Siomara in 3 weeks.

## 2025-03-03 ENCOUNTER — TELEMEDICINE (OUTPATIENT)
Dept: PSYCHOLOGY | Age: 35
End: 2025-03-03
Payer: COMMERCIAL

## 2025-03-03 DIAGNOSIS — F41.1 GAD (GENERALIZED ANXIETY DISORDER): Primary | ICD-10-CM

## 2025-03-03 PROCEDURE — 90834 PSYTX W PT 45 MINUTES: CPT | Performed by: SOCIAL WORKER

## 2025-03-03 NOTE — PATIENT INSTRUCTIONS
Aga Wilson- [email or web address]?utm_source=pem&utm_medium=direct_link&utm_campaign=19828-  [email or web address]  Emmanuel Oates podcast on chronotype  Happiness Lab: when guilt is good and when its not  [facility] (Dr. Meek Lynne)[phone]  [phone] Kolton Cash  Schedule with Dr Zbigniew Lee Lab: Anthony Smith on strengthen your back  Return to see Siomara in 4 weeks.

## 2025-03-03 NOTE — PROGRESS NOTES
strategies for increasing balanced thinking, Discussed and set plan for behavioral activation, Trained in relaxation strategies, Trained in improving communication skills, Provided education, Discussed self-care (sleep, nutrition, rewarding activities, social support, exercise), and Discussed benefits of referral for specialty care    Pt Behavioral Change Plan:   See Pt Instructions      Patient was evaluated through a synchronous (real-time) audio-video encounter. The patient (or guardian if applicable) is aware that this is a billable service, which includes applicable co-pays. This Virtual Visit was conducted with patient's (and/or legal guardian's) consent.  Patient identification was verified, and a caregiver was present when appropriate. The patient was located in a state where the provider was licensed to provide care.

## 2025-03-04 DIAGNOSIS — F41.1 GENERALIZED ANXIETY DISORDER: ICD-10-CM

## 2025-03-04 RX ORDER — SERTRALINE HYDROCHLORIDE 100 MG/1
100 TABLET, FILM COATED ORAL DAILY
Qty: 90 TABLET | Refills: 3 | Status: SHIPPED | OUTPATIENT
Start: 2025-03-04

## 2025-03-04 NOTE — TELEPHONE ENCOUNTER
Refill request for SERTRALINE medication.     Name of Pharmacy-       Last visit - 11/19/24     Pending visit - 11/25/25    Last refill -2/25/25      Medication Contract signed -   Last Oarrs ran-         Additional Comments

## 2025-03-11 SDOH — ECONOMIC STABILITY: INCOME INSECURITY: IN THE LAST 12 MONTHS, WAS THERE A TIME WHEN YOU WERE NOT ABLE TO PAY THE MORTGAGE OR RENT ON TIME?: NO

## 2025-03-11 SDOH — ECONOMIC STABILITY: TRANSPORTATION INSECURITY
IN THE PAST 12 MONTHS, HAS THE LACK OF TRANSPORTATION KEPT YOU FROM MEDICAL APPOINTMENTS OR FROM GETTING MEDICATIONS?: YES

## 2025-03-11 SDOH — ECONOMIC STABILITY: FOOD INSECURITY: WITHIN THE PAST 12 MONTHS, YOU WORRIED THAT YOUR FOOD WOULD RUN OUT BEFORE YOU GOT MONEY TO BUY MORE.: SOMETIMES TRUE

## 2025-03-11 SDOH — ECONOMIC STABILITY: FOOD INSECURITY: WITHIN THE PAST 12 MONTHS, THE FOOD YOU BOUGHT JUST DIDN'T LAST AND YOU DIDN'T HAVE MONEY TO GET MORE.: SOMETIMES TRUE

## 2025-03-12 ENCOUNTER — HOSPITAL ENCOUNTER (OUTPATIENT)
Dept: GENERAL RADIOLOGY | Age: 35
Discharge: HOME | End: 2025-03-12
Payer: COMMERCIAL

## 2025-03-12 ENCOUNTER — OFFICE VISIT (OUTPATIENT)
Dept: INTERNAL MEDICINE CLINIC | Age: 35
End: 2025-03-12

## 2025-03-12 ENCOUNTER — HOSPITAL ENCOUNTER (OUTPATIENT)
Age: 35
Discharge: HOME | End: 2025-03-12
Payer: COMMERCIAL

## 2025-03-12 VITALS
BODY MASS INDEX: 35.16 KG/M2 | WEIGHT: 224 LBS | OXYGEN SATURATION: 94 % | SYSTOLIC BLOOD PRESSURE: 124 MMHG | HEART RATE: 82 BPM | DIASTOLIC BLOOD PRESSURE: 80 MMHG | TEMPERATURE: 97.7 F | HEIGHT: 67 IN

## 2025-03-12 DIAGNOSIS — E78.2 MIXED HYPERLIPIDEMIA: ICD-10-CM

## 2025-03-12 DIAGNOSIS — F41.1 GENERALIZED ANXIETY DISORDER: Primary | ICD-10-CM

## 2025-03-12 DIAGNOSIS — M25.572 ACUTE LEFT ANKLE PAIN: ICD-10-CM

## 2025-03-12 DIAGNOSIS — E66.01 CLASS 2 SEVERE OBESITY WITH SERIOUS COMORBIDITY AND BODY MASS INDEX (BMI) OF 35.0 TO 35.9 IN ADULT, UNSPECIFIED OBESITY TYPE: ICD-10-CM

## 2025-03-12 DIAGNOSIS — R06.83 SNORING: ICD-10-CM

## 2025-03-12 DIAGNOSIS — E66.812 CLASS 2 SEVERE OBESITY WITH SERIOUS COMORBIDITY AND BODY MASS INDEX (BMI) OF 35.0 TO 35.9 IN ADULT, UNSPECIFIED OBESITY TYPE: ICD-10-CM

## 2025-03-12 DIAGNOSIS — M54.10 BACK PAIN WITH LEFT-SIDED RADICULOPATHY: ICD-10-CM

## 2025-03-12 PROCEDURE — 99214 OFFICE O/P EST MOD 30 MIN: CPT | Performed by: STUDENT IN AN ORGANIZED HEALTH CARE EDUCATION/TRAINING PROGRAM

## 2025-03-12 PROCEDURE — 73610 X-RAY EXAM OF ANKLE: CPT

## 2025-03-12 NOTE — PROGRESS NOTES
Ben Willson (:  1990) is a 34 y.o. male, here for evaluation of the following chief complaint(s):  Back Pain (Off and on for 2 months. /) and Foot Pain (Left- was having trouble walking due to pain and tripped on a rug and went down on ankle. /)         1. Generalized anxiety disorder  2. Mixed hyperlipidemia  3. Class 2 severe obesity with serious comorbidity and body mass index (BMI) of 35.0 to 35.9 in adult, unspecified obesity type  4. Back pain with left-sided radiculopathy  5. Acute left ankle pain  -     XR ANKLE LEFT (MIN 3 VIEWS); Future  6. Snoring  -     [facility]    Assessment & Plan  1. Left foot pain.  Does have tenderness at base of fifth metatarsal.  With an eversion injury and swelling there is a concern for fracture, an x-ray of the left foot will be ordered to confirm the diagnosis. If a fracture is identified, a referral to orthopedics will be made for further management.     2. Back pain.  The back pain has shown improvement with changes in sitting habits and increased mobility. Physical therapy is recommended if the pain persists. He is advised to contact the clinic if the back pain continues. Immediate medical attention should be sought by if there is a loss of bowel or bladder control or lower extremity weakness    3. Anxiety and depression.  He is currently taking sertraline 100 mg and reports improvement in symptoms. He will continue with his current medication regimen and therapy sessions.    4. Snoring.  Weight loss is recommended to alleviate snoring and potential sleep apnea. The importance of dietary modifications and behavioral changes over reliance on medications was discussed. A referral to sleep medicine will be made for a sleep study. He is advised to schedule an appointment for the sleep study.    Follow-up  The patient will follow up in 6 months.    Results    Return in about 6 months (around 2025) for HLD.       Subjective   History of

## 2025-03-13 ENCOUNTER — RESULTS FOLLOW-UP (OUTPATIENT)
Dept: GENERAL RADIOLOGY | Age: 35
End: 2025-03-13

## 2025-03-17 DIAGNOSIS — M54.10 BACK PAIN WITH LEFT-SIDED RADICULOPATHY: Primary | ICD-10-CM

## 2025-04-28 ENCOUNTER — TELEMEDICINE (OUTPATIENT)
Dept: PSYCHOLOGY | Age: 35
End: 2025-04-28
Payer: COMMERCIAL

## 2025-04-28 DIAGNOSIS — F41.1 GAD (GENERALIZED ANXIETY DISORDER): Primary | ICD-10-CM

## 2025-04-28 PROCEDURE — 90832 PSYTX W PT 30 MINUTES: CPT | Performed by: SOCIAL WORKER

## 2025-04-28 NOTE — PROGRESS NOTES
Behavioral Health Consultation  RONA Brooks  4/28/2025  10:35 AM EDT      Time spent with Patient: 30 minutes  This is patient's fourth [facility] appointment.    Reason for Consult:    Chief Complaint   Patient presents with    Anxiety    Depression     Referring Provider: Rufino Coy MD  [address] [facility]  [address]  [place] [address]    Feedback given to PCP.    TELEHEALTH VISIT -- Audio/Visual   }  Services were provided through a video synchronous discussion virtually to substitute for in-person clinic visit. Pt gave verbal informed consent to participate in telehealth services.     Conducted a risk-benefit analysis and determined that the patient's presenting problems are consistent with the use of telepsychology. Determined that the patient has sufficient knowledge and skills in the use of technology enabling them to adequately benefit from telepsychology. It was determined that this patient was able to be properly treated without an in-person session. Patient verified that they were currently located at the [place] address that was provided during registration.    Verified the following information:  Patient's identification: Yes  Patient location: [address] [place], [place] [id number]   Patient's call back number: [phone]   Patient's emergency contact's name and number, as well as permission to contact them if needed: Extended Emergency Contact Information  Primary Emergency Contact: Lucrecia Willson, [place] [facility]  Home Phone: [phone]  Mobile Phone: [phone]  Relation: Parent  Secondary Emergency Contact: Cinthya Willson  Mobile Phone: [phone]  Relation: Grandparent     Provider location: [place], [place]     S:  Pt endorses he is doing better overall. Mood has been better. Grandmother is having some medical issues, been in [place] so some concern but overall managing.  Father is anxious over this, some days for dad are rougher.  He is being treated with

## 2025-04-28 NOTE — PATIENT INSTRUCTIONS
Consider reaching out to [employer] about insurance  We can look at therapist once insurance is established  [email or web address] lose fat nita lab  Return to see Siomara in 4 weeks.